# Patient Record
Sex: FEMALE | Race: WHITE
[De-identification: names, ages, dates, MRNs, and addresses within clinical notes are randomized per-mention and may not be internally consistent; named-entity substitution may affect disease eponyms.]

---

## 2018-01-26 ENCOUNTER — HOSPITAL ENCOUNTER (EMERGENCY)
Dept: HOSPITAL 95 - ER | Age: 53
Discharge: HOME | End: 2018-01-26
Payer: COMMERCIAL

## 2018-01-26 VITALS — BODY MASS INDEX: 17.68 KG/M2 | HEIGHT: 66 IN | WEIGHT: 110.01 LBS

## 2018-01-26 DIAGNOSIS — N39.0: Primary | ICD-10-CM

## 2018-01-26 DIAGNOSIS — Z88.1: ICD-10-CM

## 2018-01-26 DIAGNOSIS — G43.909: ICD-10-CM

## 2018-01-26 DIAGNOSIS — Z88.8: ICD-10-CM

## 2018-01-26 DIAGNOSIS — Z90.89: ICD-10-CM

## 2018-01-26 DIAGNOSIS — F17.200: ICD-10-CM

## 2018-01-26 DIAGNOSIS — Z90.49: ICD-10-CM

## 2018-01-26 DIAGNOSIS — C71.9: ICD-10-CM

## 2018-01-26 DIAGNOSIS — Z79.891: ICD-10-CM

## 2018-01-26 DIAGNOSIS — Z91.030: ICD-10-CM

## 2018-01-26 DIAGNOSIS — Z79.899: ICD-10-CM

## 2018-01-26 DIAGNOSIS — Z88.5: ICD-10-CM

## 2018-01-26 DIAGNOSIS — Z88.0: ICD-10-CM

## 2018-01-26 LAB
ALBUMIN SERPL BCP-MCNC: 4.1 G/DL (ref 3.4–5)
ALBUMIN/GLOB SERPL: 1.2 {RATIO} (ref 0.8–1.8)
ALT SERPL W P-5'-P-CCNC: 26 U/L (ref 12–78)
ANION GAP SERPL CALCULATED.4IONS-SCNC: 5 MMOL/L (ref 6–16)
AST SERPL W P-5'-P-CCNC: 14 U/L (ref 12–37)
BASOPHILS # BLD AUTO: 0.05 K/MM3 (ref 0–0.23)
BASOPHILS NFR BLD AUTO: 1 % (ref 0–2)
BILIRUB SERPL-MCNC: 0.5 MG/DL (ref 0.1–1)
BUN SERPL-MCNC: 21 MG/DL (ref 8–24)
CALCIUM SERPL-MCNC: 9 MG/DL (ref 8.5–10.1)
CHLORIDE SERPL-SCNC: 107 MMOL/L (ref 98–108)
CO2 SERPL-SCNC: 30 MMOL/L (ref 21–32)
CREAT SERPL-MCNC: 0.61 MG/DL (ref 0.4–1)
DEPRECATED RDW RBC AUTO: 42.6 FL (ref 35.1–46.3)
EOSINOPHIL # BLD AUTO: 0.35 K/MM3 (ref 0–0.68)
EOSINOPHIL NFR BLD AUTO: 5 % (ref 0–6)
ERYTHROCYTE [DISTWIDTH] IN BLOOD BY AUTOMATED COUNT: 12.9 % (ref 11.7–14.2)
GLOBULIN SER CALC-MCNC: 3.5 G/DL (ref 2.2–4)
GLUCOSE SERPL-MCNC: 95 MG/DL (ref 70–99)
HCT VFR BLD AUTO: 45.6 % (ref 33–51)
HGB BLD-MCNC: 15.2 G/DL (ref 11.5–16)
IMM GRANULOCYTES # BLD AUTO: 0.01 K/MM3 (ref 0–0.1)
IMM GRANULOCYTES NFR BLD AUTO: 0 % (ref 0–1)
LEUKOCYTE ESTERASE UR QL STRIP: (no result)
LYMPHOCYTES # BLD AUTO: 2.41 K/MM3 (ref 0.84–5.2)
LYMPHOCYTES NFR BLD AUTO: 37 % (ref 21–46)
MCHC RBC AUTO-ENTMCNC: 33.3 G/DL (ref 31.5–36.5)
MCV RBC AUTO: 91 FL (ref 80–100)
MONOCYTES # BLD AUTO: 0.37 K/MM3 (ref 0.16–1.47)
MONOCYTES NFR BLD AUTO: 6 % (ref 4–13)
NEUTROPHILS # BLD AUTO: 3.41 K/MM3 (ref 1.96–9.15)
NEUTROPHILS NFR BLD AUTO: 52 % (ref 41–73)
NRBC # BLD AUTO: 0 K/MM3 (ref 0–0.02)
NRBC BLD AUTO-RTO: 0 /100 WBC (ref 0–0.2)
PLATELET # BLD AUTO: 230 K/MM3 (ref 150–400)
POTASSIUM SERPL-SCNC: 4.2 MMOL/L (ref 3.5–5.5)
PROT SERPL-MCNC: 7.6 G/DL (ref 6.4–8.2)
PROT UR STRIP-MCNC: (no result) MG/DL
RBC #/AREA URNS HPF: (no result) /HPF (ref 0–2)
SODIUM SERPL-SCNC: 142 MMOL/L (ref 136–145)
SP GR SPEC: 1.03 (ref 1–1.02)
UROBILINOGEN UR STRIP-MCNC: (no result) MG/DL
WBC #/AREA URNS HPF: (no result) /HPF (ref 0–5)

## 2018-02-27 ENCOUNTER — HOSPITAL ENCOUNTER (EMERGENCY)
Dept: HOSPITAL 95 - ER | Age: 53
Discharge: HOME | End: 2018-02-27
Payer: COMMERCIAL

## 2018-02-27 VITALS — HEIGHT: 66 IN | WEIGHT: 110.01 LBS | BODY MASS INDEX: 17.68 KG/M2

## 2018-02-27 DIAGNOSIS — Z91.038: ICD-10-CM

## 2018-02-27 DIAGNOSIS — Z88.1: ICD-10-CM

## 2018-02-27 DIAGNOSIS — Z87.442: ICD-10-CM

## 2018-02-27 DIAGNOSIS — F17.200: ICD-10-CM

## 2018-02-27 DIAGNOSIS — R10.11: Primary | ICD-10-CM

## 2018-02-27 DIAGNOSIS — Z88.5: ICD-10-CM

## 2018-02-27 DIAGNOSIS — Z88.0: ICD-10-CM

## 2018-02-27 DIAGNOSIS — R10.12: ICD-10-CM

## 2018-02-27 DIAGNOSIS — Z79.899: ICD-10-CM

## 2018-02-27 DIAGNOSIS — Z88.8: ICD-10-CM

## 2018-02-27 LAB
ALBUMIN SERPL BCP-MCNC: 3.8 G/DL (ref 3.4–5)
ALBUMIN/GLOB SERPL: 1.1 {RATIO} (ref 0.8–1.8)
ALT SERPL W P-5'-P-CCNC: 40 U/L (ref 12–78)
ANION GAP SERPL CALCULATED.4IONS-SCNC: 6 MMOL/L (ref 6–16)
AST SERPL W P-5'-P-CCNC: 20 U/L (ref 12–37)
BASOPHILS # BLD AUTO: 0.06 K/MM3 (ref 0–0.23)
BASOPHILS NFR BLD AUTO: 1 % (ref 0–2)
BILIRUB SERPL-MCNC: 0.3 MG/DL (ref 0.1–1)
BUN SERPL-MCNC: 18 MG/DL (ref 8–24)
CALCIUM SERPL-MCNC: 8.8 MG/DL (ref 8.5–10.1)
CHLORIDE SERPL-SCNC: 107 MMOL/L (ref 98–108)
CO2 SERPL-SCNC: 29 MMOL/L (ref 21–32)
CREAT SERPL-MCNC: 0.62 MG/DL (ref 0.4–1)
DEPRECATED RDW RBC AUTO: 43.1 FL (ref 35.1–46.3)
EOSINOPHIL # BLD AUTO: 0.37 K/MM3 (ref 0–0.68)
EOSINOPHIL NFR BLD AUTO: 7 % (ref 0–6)
ERYTHROCYTE [DISTWIDTH] IN BLOOD BY AUTOMATED COUNT: 12.8 % (ref 11.7–14.2)
GLOBULIN SER CALC-MCNC: 3.6 G/DL (ref 2.2–4)
GLUCOSE SERPL-MCNC: 111 MG/DL (ref 70–99)
HCT VFR BLD AUTO: 45.5 % (ref 33–51)
HGB BLD-MCNC: 15.4 G/DL (ref 11.5–16)
IMM GRANULOCYTES # BLD AUTO: 0.01 K/MM3 (ref 0–0.1)
IMM GRANULOCYTES NFR BLD AUTO: 0 % (ref 0–1)
LYMPHOCYTES # BLD AUTO: 1.95 K/MM3 (ref 0.84–5.2)
LYMPHOCYTES NFR BLD AUTO: 37 % (ref 21–46)
MCHC RBC AUTO-ENTMCNC: 33.8 G/DL (ref 31.5–36.5)
MCV RBC AUTO: 91 FL (ref 80–100)
MONOCYTES # BLD AUTO: 0.41 K/MM3 (ref 0.16–1.47)
MONOCYTES NFR BLD AUTO: 8 % (ref 4–13)
NEUTROPHILS # BLD AUTO: 2.54 K/MM3 (ref 1.96–9.15)
NEUTROPHILS NFR BLD AUTO: 48 % (ref 41–73)
NRBC # BLD AUTO: 0 K/MM3 (ref 0–0.02)
NRBC BLD AUTO-RTO: 0 /100 WBC (ref 0–0.2)
PLATELET # BLD AUTO: 216 K/MM3 (ref 150–400)
POTASSIUM SERPL-SCNC: 3.9 MMOL/L (ref 3.5–5.5)
PROT SERPL-MCNC: 7.4 G/DL (ref 6.4–8.2)
SODIUM SERPL-SCNC: 142 MMOL/L (ref 136–145)

## 2018-06-04 ENCOUNTER — HOSPITAL ENCOUNTER (OUTPATIENT)
Dept: HOSPITAL 95 - LAB SHORT | Age: 53
End: 2018-06-04
Attending: NURSE PRACTITIONER
Payer: COMMERCIAL

## 2018-06-04 DIAGNOSIS — C71.9: Primary | ICD-10-CM

## 2022-04-24 NOTE — NUR
PATIENT IS HAVING 10/10 PAIN TO RIGHT ARM DESPITE TORADOL, 
NOTIFIED, ORDERS OBTAINED FOR HOME OXYCODONE AND DILAUDID FOR BREATH THROUGH,
ALSO OBTAINED A NICOTINE PATCH PER PATIENT REQUEST, VERIFIED IT WAS OKAY TO
GIVE LOVENOX, ASA AND TORODAL THIS EVENING AS WELL DESPITE MEDICATIONS ALL
BEING BLOOD THINNERS, DR. BARILLAS STATED TO GIVE ALL MEDICATIONS, PATIENT
AND FAMILY UPDATED ON PLAN OF CARE

## 2022-04-25 NOTE — NUR
Pt has been discharged to home after Dr. Lentz read the MRI, iv removed
intact, pain meds administered at her request, went over discharge
instructions with her and her spouce, new medication called into [facility]
pharmacy, left via wheelchair with spouce and cna in attendence, and all
belongings.

## 2022-04-25 NOTE — NUR
SHIFT SUMMARY
 
PATIENT ARRIVED TO THE UNIT AT APPROX 2000, A&O, VSS ON RA, PATIENT CRYING IN
PAIN, STATING THAT HER RIGHT ARM HURTS  AT THE BEDSIDE, MD NOTIFIED FOR
PAIN MEDICATION, PERCOCET AND DILAUDID EACH ADMINISTERED X 1 THIS SHIFT, PAIN
IS WELL MANAGED, PATIENT HAS A FENTANYL PATCH TO LOWER LEFT ABD, STATING IT
NEEDS TO BE REPLACED ON 04/25 WILL PASS ALONG TO DAY SHIFT TO GET PATCH
REORDERED, RIGHT ARM AND LEG CONTINUE TO BE WEAK WITH GROSS MOTOR MOVEMENT
NOTED, PATIENT IS CONTINENT OF URINE, SBA WITH FWW, TELE HAS BEEN SR 60s,
MINIMAL REQUESTS NOTED THIS SHIFT

## 2022-04-25 NOTE — NUR
Received a request from bedside RN Meredith to assist with [facility] referral. Dr. Lentz had requested the referral be made for pt.
Chart notes, med list, face sheet faxed to [facility].

## 2022-04-25 NOTE — NUR
pt laying in bed awake, a/ox3, cooperative with care, follows commands well,
denies pain, does report tingling in her right hand, lungs are clear t/o, resp
even and unlabored, no cough noted, hrr, tele in place running sr in 60's, iv
to lac site is clear and patent, infusing ns as ordered, no edema noted, ppp
faint, cap refill <3sec, vs stable, afebrile, btx4, abd flat soft nontender,
voids without diff, skin c/w/d, moves left side well, right side is weak, and
right hand is slightly contracted, but she can open it with effort, tiffany,
call light in reach.